# Patient Record
Sex: MALE | Race: OTHER | URBAN - METROPOLITAN AREA
[De-identification: names, ages, dates, MRNs, and addresses within clinical notes are randomized per-mention and may not be internally consistent; named-entity substitution may affect disease eponyms.]

---

## 2024-01-01 ENCOUNTER — EMERGENCY (EMERGENCY)
Facility: HOSPITAL | Age: 64
LOS: 0 days | End: 2024-10-18
Attending: STUDENT IN AN ORGANIZED HEALTH CARE EDUCATION/TRAINING PROGRAM
Payer: MEDICAID

## 2024-01-01 VITALS
DIASTOLIC BLOOD PRESSURE: 80 MMHG | HEART RATE: 69 BPM | SYSTOLIC BLOOD PRESSURE: 108 MMHG | OXYGEN SATURATION: 100 % | RESPIRATION RATE: 17 BRPM

## 2024-01-01 VITALS
HEART RATE: 130 BPM | DIASTOLIC BLOOD PRESSURE: 51 MMHG | SYSTOLIC BLOOD PRESSURE: 61 MMHG | OXYGEN SATURATION: 86 % | RESPIRATION RATE: 21 BRPM | HEIGHT: 66 IN | WEIGHT: 160.06 LBS

## 2024-01-01 DIAGNOSIS — E11.9 TYPE 2 DIABETES MELLITUS WITHOUT COMPLICATIONS: ICD-10-CM

## 2024-01-01 DIAGNOSIS — I46.9 CARDIAC ARREST, CAUSE UNSPECIFIED: ICD-10-CM

## 2024-01-01 DIAGNOSIS — R40.4 TRANSIENT ALTERATION OF AWARENESS: ICD-10-CM

## 2024-01-01 PROCEDURE — 99285 EMERGENCY DEPT VISIT HI MDM: CPT | Mod: 25

## 2024-01-01 PROCEDURE — 92950 HEART/LUNG RESUSCITATION CPR: CPT

## 2024-10-18 NOTE — ED PROVIDER NOTE - PHYSICAL EXAMINATION
General appearance: Intubated, unresponsive   Eyes: anicteric sclerae, HERB 4mm b/l   HENT: Atraumatic; ETT in place   Neck: Trachea midline; Full range of motion, supple   Pulm: Intubated, b/l breath sounds    CV: RRR, no murmurs   Abdomen: Soft, distended;    Extremities: No peripheral edema, no gross deformities   Skin: Dry, normal temperature, turgor and texture; no rash

## 2024-10-18 NOTE — ED ADULT TRIAGE NOTE - HEIGHT IN INCHES
Anesthesia Evaluation      Patient summary reviewed     Airway   Mallampati: II  Neck ROM: full   Pulmonary    (+) shortness of breath, decreased breath sounds, a smoker  (-) pneumonia, asthma, recent URI, sleep apnea    ROS comment: Lung cancer, s/p RML, RLL resection lung                         Cardiovascular   Exercise tolerance: < 4 METS  (+) hypertension, , hypercholesterolemia,     (-) angina  ECG reviewed  Rhythm: regular  Rate: normal,         Neuro/Psych    (-) no seizures, no neuromuscular disease, no CVA    Endo/Other    (+) diabetes mellitus poorly controlled, obesity,      GI/Hepatic/Renal            Dental    (+) lower dentures and upper dentures                       Anesthesia Plan  Planned anesthetic: general endotracheal  ISB/SCP blocks for postopm pain, exparel per surgeon request   ASA 4   Induction: intravenous   Anesthetic plan and risks discussed with: patient  Anesthesia plan special considerations: antiemetics,   Post-op plan: routine recovery           6

## 2024-10-18 NOTE — ED PROVIDER NOTE - CLINICAL SUMMARY MEDICAL DECISION MAKING FREE TEXT BOX
63yo male with pmh dm presenting 63yo male with pmh dm presenting in cardiac arrest.  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  Bystanders started cpr and called ems.  EMS arrived and started acls.  PEA throughout.  Glucose 60s.  Given 6 epi, 1 calcium, 1 amp with repeat 150s.  EMS states when they pulled up they got rosc after about 45 min working on him.  Has pulse on arrival with hr 80s regular on monitor which after about 5 minutes was again lost.  CPR resumed, vfib/ torsades and pea.  Shocked several times with rosc a few times and pea.  After several rounds without persistent rosc, pupils b/l and blown not responsive to light with downtime >1 hour.  TOD called 12:01.

## 2024-10-18 NOTE — ED ADULT TRIAGE NOTE - CHIEF COMPLAINT QUOTE
BIBEMS s/p witness cardiac arrest at work. EMS states pt collapsed and CPR started immediately by coworkers. Estimated 45 min downtime. 6 Epi, 1 Calcium, 1 amp dextrose given by EMS. ET 7.5, 24 at lip line. OG tube placed by EMS. EMS obtained ROSC at 11:27 before arrival.

## 2024-10-18 NOTE — ED PROVIDER NOTE - CRITICAL CARE ATTENDING CONTRIBUTION TO CARE
Upon my evaluation, this patient had a high probability of imminent or life-threatening deterioration due to cardiac arrest, which required my direct attention, intervention, and personal management.  The patient has a  medical condition that impairs one or more vital organ systems.  Frequent personal assessment and adjustment of medical interventions was performed.      I have personally provided 75 minutes of critical care time exclusive of time spent on separately billable procedures. Time includes review of laboratory data, radiology results, discussion with consultants, patient and family; monitoring for potential decompensation, as well as time spent retrieving data and reviewing the chart and documenting the visit. Interventions were performed as documented above.